# Patient Record
(demographics unavailable — no encounter records)

---

## 2025-05-15 NOTE — HISTORY OF PRESENT ILLNESS
[de-identified] : Ms. LAZARA PARK is a 62-year-old woman, self-referred for newly dx HER2+ breast cancer, here for an initial visit. She noted a palpable right breast mass x 3 weeks, prompting imaging & subsequent US biopsy confirming breast cancer.  PCP: Dr. Gerardo. she had a reduction mammoplasty 3 years ago but has never had any breast masses  She is accompanied by her daughter Donnell.  She started annual mammograms in her 40's and denies any prior breast biopsies.  FamHX of sister w/ breast cancer age unknown but . PMH of B/L mammoplasty   B/L mammo 2024 ([facility]) - BIRADS 2  In 2025 she noted a palpable mass in her right breast prompting diagnostic imaging.  RIGHT mammo/sono 2025 (R) - R 8:00 N8, at palpable abnormality, 2.9 cm lobulated/spiculated mass c/w malignant lesion -> f/u R US biopsy BI-RADS 5  US biopsy 2025 - R 8:00 N8 (stoplight): poorly differentiated IDC, no LVI, ER+/[place]-/TWU4kul + (3+), Ki67: 60%   PMH: none PSH: breast reduction, ankle surgery, shoulder surgery, gastric sleeve () 20 lb weight loss Family hx. in her sister

## 2025-05-15 NOTE — ASSESSMENT
[FreeTextEntry1] : IMP: 63yo F w/ HER2+ Right breast cancer (8:00 N + 8)  US biopsy 4/25/2025 - R 8:00 N8 (stoplight): poorly differentiated IDC, no LVI, ER+ /PA- /QYN3elc + (3+), Ki67: 60%   PLAN: MRI CT C/A/P & bone scan genetics (sister w/ breast cancer) drawn today Evaluation at [facility] next Thursday for probable kleber-adjuvant chemotherapy

## 2025-05-15 NOTE — ASSESSMENT
[FreeTextEntry1] : IMP: 61yo F w/ HER2+ Right breast cancer (8:00 N + 8)  US biopsy 4/25/2025 - R 8:00 N8 (stoplight): poorly differentiated IDC, no LVI, ER+ /ND- /YJP4ich + (3+), Ki67: 60%   PLAN: MRI CT C/A/P & bone scan genetics (sister w/ breast cancer) drawn today Evaluation at [facility] next Thursday for probable kleber-adjuvant chemotherapy

## 2025-05-15 NOTE — PHYSICAL EXAM
[Normal] : supple, no neck mass and thyroid not enlarged [Normal Supraclavicular Lymph Nodes] : normal supraclavicular lymph nodes [Normal Axillary Lymph Nodes] : normal axillary lymph nodes [Normal] : oriented to person, place and time, with appropriate affect [de-identified] : Reduction scars witha 4 cm mass in the lateral aspect of the right breast

## 2025-05-15 NOTE — ASSESSMENT
[FreeTextEntry1] : IMP: 63yo F w/ HER2+ Right breast cancer (8:00 N + 8)  US biopsy 4/25/2025 - R 8:00 N8 (stoplight): poorly differentiated IDC, no LVI, ER+ /TN- /TFR9gkq + (3+), Ki67: 60%   PLAN: MRI CT C/A/P & bone scan genetics (sister w/ breast cancer) drawn today Evaluation at [facility] next Thursday for probable kleber-adjuvant chemotherapy   Home

## 2025-05-15 NOTE — HISTORY OF PRESENT ILLNESS
[de-identified] : Ms. LAZARA PARK is a 62-year-old woman, self-referred for newly dx HER2+ breast cancer, here for an initial visit. She noted a palpable right breast mass x 3 weeks, prompting imaging & subsequent US biopsy confirming breast cancer.  PCP: Dr. Gerardo. she had a reduction mammoplasty 3 years ago but has never had any breast masses  She is accompanied by her daughter Donnell.  She started annual mammograms in her 40's and denies any prior breast biopsies.  FamHX of sister w/ breast cancer age unknown but . PMH of B/L mammoplasty   B/L mammo 2024 ([facility]) - BIRADS 2  In 2025 she noted a palpable mass in her right breast prompting diagnostic imaging.  RIGHT mammo/sono 2025 (R) - R 8:00 N8, at palpable abnormality, 2.9 cm lobulated/spiculated mass c/w malignant lesion -> f/u R US biopsy BI-RADS 5  US biopsy 2025 - R 8:00 N8 (stoplight): poorly differentiated IDC, no LVI, ER+/[place]-/CQG6afo + (3+), Ki67: 60%   PMH: none PSH: breast reduction, ankle surgery, shoulder surgery, gastric sleeve () 20 lb weight loss Family hx. in her sister

## 2025-05-15 NOTE — PHYSICAL EXAM
[Normal] : supple, no neck mass and thyroid not enlarged [Normal Supraclavicular Lymph Nodes] : normal supraclavicular lymph nodes [Normal Axillary Lymph Nodes] : normal axillary lymph nodes [Normal] : oriented to person, place and time, with appropriate affect [de-identified] : Reduction scars witha 4 cm mass in the lateral aspect of the right breast

## 2025-05-15 NOTE — HISTORY OF PRESENT ILLNESS
[de-identified] : Ms. LAZARA PARK is a 62-year-old woman, self-referred for newly dx HER2+ breast cancer, here for an initial visit. She noted a palpable right breast mass x 3 weeks, prompting imaging & subsequent US biopsy confirming breast cancer.  PCP: Dr. Gerardo. she had a reduction mammoplasty 3 years ago but has never had any breast masses  She is accompanied by her daughter Donnell.  She started annual mammograms in her 40's and denies any prior breast biopsies.  FamHX of sister w/ breast cancer age unknown but . PMH of B/L mammoplasty   B/L mammo 2024 ([facility]) - BIRADS 2  In 2025 she noted a palpable mass in her right breast prompting diagnostic imaging.  RIGHT mammo/sono 2025 (R) - R 8:00 N8, at palpable abnormality, 2.9 cm lobulated/spiculated mass c/w malignant lesion -> f/u R US biopsy BI-RADS 5  US biopsy 2025 - R 8:00 N8 (stoplight): poorly differentiated IDC, no LVI, ER+/[place]-/THN4qrz + (3+), Ki67: 60%   PMH: none PSH: breast reduction, ankle surgery, shoulder surgery, gastric sleeve () 20 lb weight loss Family hx. in her sister

## 2025-05-15 NOTE — CONSULT LETTER
[FreeTextEntry2] : Kody Arrieta MD [FreeTextEntry1] : I will keep you informed of the extent of disease work-up and my subsequent plans. [FreeTextEntry3] : Gibran Mars MD FACS Chief of Surgical Oncology

## 2025-05-15 NOTE — PHYSICAL EXAM
[Normal] : supple, no neck mass and thyroid not enlarged [Normal Supraclavicular Lymph Nodes] : normal supraclavicular lymph nodes [Normal Axillary Lymph Nodes] : normal axillary lymph nodes [Normal] : oriented to person, place and time, with appropriate affect [de-identified] : Reduction scars witha 4 cm mass in the lateral aspect of the right breast

## 2025-05-20 NOTE — HISTORY OF PRESENT ILLNESS
[FreeTextEntry1] : Ms Gutiérrez is a 62-year-old female with newly dx HER2+ breast cancer. The patient was referred her by her breast surgeon for radiotherapy consultation.  She started annual mammograms in her 40's and denies any history of prior breast biopsies. The patient reports she had a reduction mammoplasty 3 years ago but has never noted any breast masses upon palpation. The patient noted a palpable right breast mass at about 4 weeks ago, prompting imaging & subsequent US biopsy confirming breast cancer.  On 2024 Bilateral  screening  mammogram, There is no suspicious masses, calcifications, or areas of architectural distortion. Post  reduction mammoplasty changes are present. Benign appearing calcifications are present. No mammographic evidence of  malignancy,  BI RADS 2  The patient noted a palpable right breast mass at about 4 weeks ago  On 2025, pt had Unilateral right diagnostic mammogram/breast US which showed right breast at 6:00, 2cmfn a [id number] cyst. Corresponding to the palpable abnormality at 8.00, 8cmfn is a 2.9x2.2x2.6cm lobulated/microlobulated/spiculated mass with vascularity and sonographic characteristics concordant with a malignancy. The palpable mass in the in the right breast has mammographic and sonographic characteristic concordant with malignant lesion. Sonographic guided core biopsy was recommended, BI RADS 5.  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 [place] score 9/9.Invasive tumor measures at least 19 mm. Lymphovascular permeation by tumor not seen, ER:  Positive (11-20%), NC:  Negative (<1%), Her-2:  Positive (score 3+)., Ki67:  60%.  She has family history of sister with breast cancer age unknown but now .

## 2025-05-22 NOTE — PHYSICAL EXAM
[Fully active, able to carry on all pre-disease performance without restriction] : Status 0 - Fully active, able to carry on all pre-disease performance without restriction [Normal] : affect appropriate [de-identified] : Bilateral reduction mastopexies, keloids R > L. R breast with large, hard mass 7:00 measuring 4cm . No nipple retraction or drainage. ; left breast with no palpable lesions [de-identified] : keloids in mastoplexy scars

## 2025-05-22 NOTE — CONSULT LETTER
[Dear  ___] : Dear  [unfilled], [Consult Letter:] : I had the pleasure of evaluating your patient, [unfilled]. [Please see my note below.] : Please see my note below. [Consult Closing:] : Thank you very much for allowing me to participate in the care of this patient.  If you have any questions, please do not hesitate to contact me. [Sincerely,] : Sincerely, [FreeTextEntry3] : Audrey Clements MD   Division of Hematology/[place] [address] Tel: [phone] Fax: [phone] Email: [email or web address] [DrUnique  ___] : Dr. MORTON [DrUnique ___] : Dr. MORTON

## 2025-05-22 NOTE — ASSESSMENT
[FreeTextEntry1] : 63 yo woman presented with clinical Stage IIA right breast cancer (ER+ weak, NV neg, HER2+)  - awaiting completion of staging scans - due for bone scan on 5/27/25 and CT abd/Plv will be scheduled on same day to complete extent of disease staging scans - reviewed CT Chest will need follow-up CT in about 3 months (8/2025) to ensure stability of pulmonary nodules - discussed option of neoadjuvant chemotherapy with TCHP regimen (docetaxel 75mg/m2, carboplatin AUC 5-6, trastuzumab 6mg/kg after loading dose and pertuzumab 420mg after loading dose) for a total of 6 doses every 3 weeks. She will need growth factor support with this regimen with peg-filgastrim to be administered at 24 hours after completion of treatment. Advised that since she does not have lymph node involvement, I would omit anthracycline based chemotherapy.  She may need adjuvant RT as well if she opts for breast conservation surgery. - at the time of surgery, if she achieves complete pathologic response, would continue HER2 directed therapy with trastuzumab and pertuzumab to complete 1 year of therapy. However, if she has residual disease, would change her therapy to TDM-1 (Kadcyla) to complete 1 year of therapy as per results of the Yenifer Trial that has shown better disease free survival. - risks/benefits/side effects including but not limited to myelousuppression, neuropathy, cardiotoxicity, nephrotoxicity, fatigue, nausea, vomiting, diarrhea and alopecia were discussed with the patient and her family. Discussed option of cold-capping at the time of therapy to reduce alopecia and discussed "cold gloves and socks" to reduce taxane associated neuropathy. - Discussed the option of Cold Capping. - will check labs today including CBC, Chem panel, hepatitis profile and coag - will need Mediport placement; ordered via IR department - will need Echocardiogram; patient reports she had both EKG and Echo done with PMD (Dr. Hayden Gerardo); surgical team to obtain these reports  - Discussed with patient that exercise and lifestyle modifications can result in improvement of fatigue, anxiety, sleep disturbance, depression and ultimately overall survival. Based on the current literature, an effective exercise prescription that most consistently addresses health-related outcomes experienced due to cancer diagnosis and cancer treatment includes moderate intensity aerobic training at least 3 times per week, for at least 30 minutes for at least 8-12 weeks. The addition of resistance training to aerobic training, at least 2 times per week, using at least 2 sets of 8-15 repetitions at least 60% of one repetition maximum, appears to result in similar benefits (Cyndee et all 2019, American College of Sports Medicine).  - Prescription for "cranial prosthesis" given.  - Patient had the opportunity to have all their questions answered to their satisfaction - f/u after scans for formal chemotherapy teaching and planning session

## 2025-05-22 NOTE — PHYSICAL EXAM
[Fully active, able to carry on all pre-disease performance without restriction] : Status 0 - Fully active, able to carry on all pre-disease performance without restriction [Normal] : affect appropriate [de-identified] : Bilateral reduction mastopexies, keloids R > L. R breast with large, hard mass 7:00 measuring 4cm . No nipple retraction or drainage. ; left breast with no palpable lesions [de-identified] : keloids in mastoplexy scars

## 2025-05-22 NOTE — HISTORY OF PRESENT ILLNESS
[Disease: _____________________] : Disease: [unfilled] [T: ___] : T[unfilled] [N: ___] : N[unfilled] [M: ___] : M[unfilled] [AJCC Stage: ____] : AJCC Stage: [unfilled] [ECOG Performance Status: 0 - Fully active, able to carry on all pre-disease performance without restriction] : Performance Status: 0 - Fully active, able to carry on all pre-disease performance without restriction [de-identified] : Ms Gutiérrez seen at age 62 for initial visit as part of [facility] program; diagnosed with ER+ HER2+ right breast cancer. Referred for discussion about neoadjuvant chemotherpy  She started annual mammograms in her 40's and denies any history of prior breast biopsies. She underwent reduction mammoplasty 3 years ago but has never noted any breast masses upon palpation. In 2025 she palpabled right breast mass,  prompting imaging & subsequent US biopsy confirming breast cancer.  2024 Bilateral screening mammogram - no suspicious masses, calcifications, or areas of architectural distortion. Post reduction mammoplasty changes are present. Benign appearing calcifications are present. No mammographic evidence of malignancy, BI RADS 2  2025 Unilateral right breast diagnostic mammogram/breast US  ([facility])  6:00, 2cmfn a [id number] cyst. Corresponding to the palpable abnormality at 8.00, 8cmfn is a 2.9x2.2x2.6cm lobulated/microlobulated/spiculated mass with vascularity and sonographic characteristics concordant with a malignancy. The palpable mass in the in the right breast has mammographic and sonographic characteristic concordant with malignant lesion. Sonographic guided core biopsy was recommended, BI RADS 5.  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 [place] score 9/9.Invasive tumor measures at least 19 mm. Lymphovascular permeation by tumor not seen, ER: Positive (11-20%), FL: Negative (<1%), Her-2: Positive (score 3+)., Ki67: 60%.  25 MRI Breast - IMPRESSION: - Biopsy proven malignancy in the lower outer right breast for which continued surgical/oncologic management is recommended. - Multiple additional indeterminate areas of nonmass enhancement superior to the dominant mass, as well as additional subcentimeter enhancing  masses outer breast - if it will alter management, MR guided biopsy of one of these areas distant to the dominant mass may be performed. - 5 mm indeterminate enhancing mass in the superior central left breast for which MR guided core needle biopsy is recommended. Assuming benign results are obtained short-term follow-up of additional finding in the far posterior medial to central left breast would be recommended. - Nonspecific skin thickening and enhancement along the lower outer right breast and chest wall which is nonspecific (c/w keloids)  25 CT Chest- small subcm pulm nodules (less than 4mm); no other evidence of malignancy CT Abd/Plv pending 25  Bone scan  RISK ASSESSMENT: ; first full term pregnancy at age 18 Menarche at 14, menopause at 49 No OCPs, No HRT, No fertility treatments  Sister with breast cancer now ; was diagnosed in her 40s; breast cancer in both maternal and paternal cousings [employer] - pending [de-identified] : poorly differentiated [de-identified] : ER+ weak (11-20%) PA neg, HER2 + (1HC 3+) [de-identified] : Ki67 60% [de-identified] : 5/22/25: Initial consult with medical oncology.  PCP - Dr. Hayden Gerardo Surgeon - Dr. Gibran Mars Rad Onc - Dr. Sondra Gerardo [100: Normal, no complaints, no evidence of disease.] : 100: Normal, no complaints, no evidence of disease.

## 2025-05-22 NOTE — REASON FOR VISIT
[Initial Consultation] : an initial consultation [Friend] : friend [FreeTextEntry2] : Right breast cancer

## 2025-05-22 NOTE — HISTORY OF PRESENT ILLNESS
[Disease: _____________________] : Disease: [unfilled] [T: ___] : T[unfilled] [N: ___] : N[unfilled] [M: ___] : M[unfilled] [AJCC Stage: ____] : AJCC Stage: [unfilled] [ECOG Performance Status: 0 - Fully active, able to carry on all pre-disease performance without restriction] : Performance Status: 0 - Fully active, able to carry on all pre-disease performance without restriction [de-identified] : Ms Gutiérrez seen at age 62 for initial visit as part of [facility] program; diagnosed with ER+ HER2+ right breast cancer. Referred for discussion about neoadjuvant chemotherpy  She started annual mammograms in her 40's and denies any history of prior breast biopsies. She underwent reduction mammoplasty 3 years ago but has never noted any breast masses upon palpation. In 2025 she palpabled right breast mass,  prompting imaging & subsequent US biopsy confirming breast cancer.  2024 Bilateral screening mammogram - no suspicious masses, calcifications, or areas of architectural distortion. Post reduction mammoplasty changes are present. Benign appearing calcifications are present. No mammographic evidence of malignancy, BI RADS 2  2025 Unilateral right breast diagnostic mammogram/breast US  ([facility])  6:00, 2cmfn a [id number] cyst. Corresponding to the palpable abnormality at 8.00, 8cmfn is a 2.9x2.2x2.6cm lobulated/microlobulated/spiculated mass with vascularity and sonographic characteristics concordant with a malignancy. The palpable mass in the in the right breast has mammographic and sonographic characteristic concordant with malignant lesion. Sonographic guided core biopsy was recommended, BI RADS 5.  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 [place] score 9/9.Invasive tumor measures at least 19 mm. Lymphovascular permeation by tumor not seen, ER: Positive (11-20%), CA: Negative (<1%), Her-2: Positive (score 3+)., Ki67: 60%.  25 MRI Breast - IMPRESSION: - Biopsy proven malignancy in the lower outer right breast for which continued surgical/oncologic management is recommended. - Multiple additional indeterminate areas of nonmass enhancement superior to the dominant mass, as well as additional subcentimeter enhancing  masses outer breast - if it will alter management, MR guided biopsy of one of these areas distant to the dominant mass may be performed. - 5 mm indeterminate enhancing mass in the superior central left breast for which MR guided core needle biopsy is recommended. Assuming benign results are obtained short-term follow-up of additional finding in the far posterior medial to central left breast would be recommended. - Nonspecific skin thickening and enhancement along the lower outer right breast and chest wall which is nonspecific (c/w keloids)  25 CT Chest- small subcm pulm nodules (less than 4mm); no other evidence of malignancy CT Abd/Plv pending 25  Bone scan  RISK ASSESSMENT: ; first full term pregnancy at age 18 Menarche at 14, menopause at 49 No OCPs, No HRT, No fertility treatments  Sister with breast cancer now ; was diagnosed in her 40s; breast cancer in both maternal and paternal cousings [employer] - pending [de-identified] : poorly differentiated [de-identified] : ER+ weak (11-20%) VA neg, HER2 + (1HC 3+) [de-identified] : Ki67 60% [de-identified] : 5/22/25: Initial consult with medical oncology.  PCP - Dr. Hayden Gerardo Surgeon - Dr. Gibran Mars Rad Onc - Dr. Sondra Gerardo [100: Normal, no complaints, no evidence of disease.] : 100: Normal, no complaints, no evidence of disease.

## 2025-05-22 NOTE — ASSESSMENT
[FreeTextEntry1] : 61 yo woman presented with clinical Stage IIA right breast cancer (ER+ weak, NV neg, HER2+)  - awaiting completion of staging scans - due for bone scan on 5/27/25 and CT abd/Plv will be scheduled on same day to complete extent of disease staging scans - reviewed CT Chest will need follow-up CT in about 3 months (8/2025) to ensure stability of pulmonary nodules - discussed option of neoadjuvant chemotherapy with TCHP regimen (docetaxel 75mg/m2, carboplatin AUC 5-6, trastuzumab 6mg/kg after loading dose and pertuzumab 420mg after loading dose) for a total of 6 doses every 3 weeks. She will need growth factor support with this regimen with peg-filgastrim to be administered at 24 hours after completion of treatment. Advised that since she does not have lymph node involvement, I would omit anthracycline based chemotherapy.  She may need adjuvant RT as well if she opts for breast conservation surgery. - at the time of surgery, if she achieves complete pathologic response, would continue HER2 directed therapy with trastuzumab and pertuzumab to complete 1 year of therapy. However, if she has residual disease, would change her therapy to TDM-1 (Kadcyla) to complete 1 year of therapy as per results of the Yenifer Trial that has shown better disease free survival. - risks/benefits/side effects including but not limited to myelousuppression, neuropathy, cardiotoxicity, nephrotoxicity, fatigue, nausea, vomiting, diarrhea and alopecia were discussed with the patient and her family. Discussed option of cold-capping at the time of therapy to reduce alopecia and discussed "cold gloves and socks" to reduce taxane associated neuropathy. - Discussed the option of Cold Capping. - will check labs today including CBC, Chem panel, hepatitis profile and coag - will need Mediport placement; ordered via IR department - will need Echocardiogram; patient reports she had both EKG and Echo done with PMD (Dr. Hayden Gerardo); surgical team to obtain these reports  - Discussed with patient that exercise and lifestyle modifications can result in improvement of fatigue, anxiety, sleep disturbance, depression and ultimately overall survival. Based on the current literature, an effective exercise prescription that most consistently addresses health-related outcomes experienced due to cancer diagnosis and cancer treatment includes moderate intensity aerobic training at least 3 times per week, for at least 30 minutes for at least 8-12 weeks. The addition of resistance training to aerobic training, at least 2 times per week, using at least 2 sets of 8-15 repetitions at least 60% of one repetition maximum, appears to result in similar benefits (Cyndee et all 2019, American College of Sports Medicine).  - Prescription for "cranial prosthesis" given.  - Patient had the opportunity to have all their questions answered to their satisfaction - f/u after scans for formal chemotherapy teaching and planning session

## 2025-05-22 NOTE — CONSULT LETTER
[Dear  ___] : Dear  [unfilled], [Consult Letter:] : I had the pleasure of evaluating your patient, [unfilled]. [Please see my note below.] : Please see my note below. [Consult Closing:] : Thank you very much for allowing me to participate in the care of this patient.  If you have any questions, please do not hesitate to contact me. [Sincerely,] : Sincerely, [FreeTextEntry3] : Audrey Clements MD   Division of Hematology/[place] [address] Tel: [phone] Fax: [phone] Email: [email or web address] [DrUnique  ___] : Dr. MORTON [DrUinque ___] : Dr. MORTON

## 2025-05-28 NOTE — HISTORY OF PRESENT ILLNESS
[FreeTextEntry1] : Ms. Gutiérrez is a 65-year-old female with newly diagnosed breast cancer, referred to the [facility] for consultation regarding breast cancer treatment.  She was undergoing routine annual screening mammography. Screening mammography ([facility]) on 6/17/24 showed no evidence of malignancy. She then palpated a mass in the right breast in March 2025. Right diagnostic mammography on 4/16/25 showed architectural distortion in the upper outer quadrant measuring up to 2.6 cm with associated spiculation consistent with malignancy. Breast ultrasound showed a lobulated spiculated hypoechoic mass measuring up to 2.9 cm in the right breast at 8:00 8 cm FN, corresponding to the mammographic finding.  Ultrasound guided core biopsy of the right breast on 4/25/25 showed invasive poorly differentiated ductal carcinoma, [place] score 9/9, measuring at least 18 mm. There was no in situ carcinoma and no lymphovascular invasion. IHC showed ER 11-20%, [place] <1%%, and HER2 3+, and Ki-67 60%.   Breast MRI on 5/21/25 showed recently diagnosed malignancy in the right lower outer quadrant at posterior depth measuring up to 3.6 cm. Skin thickening was present along the lateral lower outer right breast. Nonmass enhancement spanning up to 5.6 cm was noted in the central outer right breast. Additional smaller enhancing masses were noted. A 5 mm indeterminate enhancing mass was seen in the superior central left breast. There was no axillary or internal mammary lymphadenopathy.  Extent of disease work up including CT AP on 5/21/25 showed no evidence of distant metastatic disease.   CT chest and bone scan are scheduled for 5/27/25.   Results of genetic testing on 5/15/25 are pending.

## 2025-05-28 NOTE — PHYSICAL EXAM
[Sclera] : the sclera and conjunctiva were normal [Outer Ear] : the ears and nose were normal in appearance [] : no respiratory distress [Heart Rate And Rhythm] : heart rate and rhythm were normal [No UE Edema] : there is no upper extremity edema [Abdomen Soft] : soft [Nondistended] : nondistended [Supraclavicular Lymph Nodes Enlarged Bilaterally] : supraclavicular [Axillary Lymph Nodes Enlarged Bilaterally] : axillary [de-identified] : right breast mass at lateral aspect, no skin involvement or tethering, no erythema, mobile, approx 4 cm; bilateral mammoplasty scars with prominent keloid formation

## 2025-05-28 NOTE — LETTER CLOSING
[Consult Closing:] : Thank you for allowing me to participate in the care of this patient.  If you have any questions, please do not hesitate to contact me. [Sincerely yours,] : Sincerely yours, [FreeTextEntry3] : Sondra Gerardo MD

## 2025-05-28 NOTE — PHYSICAL EXAM
[Sclera] : the sclera and conjunctiva were normal [Outer Ear] : the ears and nose were normal in appearance [] : no respiratory distress [Heart Rate And Rhythm] : heart rate and rhythm were normal [No UE Edema] : there is no upper extremity edema [Abdomen Soft] : soft [Nondistended] : nondistended [Supraclavicular Lymph Nodes Enlarged Bilaterally] : supraclavicular [Axillary Lymph Nodes Enlarged Bilaterally] : axillary [de-identified] : right breast mass at lateral aspect, no skin involvement or tethering, no erythema, mobile, approx 4 cm; bilateral mammoplasty scars with prominent keloid formation

## 2025-05-28 NOTE — HISTORY OF PRESENT ILLNESS
[FreeTextEntry1] : Ms. Gutiérrez is a 65-year-old female with newly diagnosed breast cancer, referred to the [facility] for consultation regarding breast cancer treatment.  She was undergoing routine annual screening mammography. Screening mammography ([facility]) on 6/17/24 showed no evidence of malignancy. She then palpated a mass in the right breast in March 2025. Right diagnostic mammography on 4/16/25 showed architectural distortion in the upper outer quadrant measuring up to 2.6 cm with associated spiculation consistent with malignancy. Breast ultrasound showed a lobulated spiculated hypoechoic mass measuring up to 2.9 cm in the right breast at 8:00 8 cm FN, corresponding to the mammographic finding.  Ultrasound guided core biopsy of the right breast on 4/25/25 showed invasive poorly differentiated ductal carcinoma, [place] score 9/9, measuring at least 18 mm. There was no in situ carcinoma and no lymphovascular invasion. IHC showed ER 11-20%, [place] <1%%, and HER2 3+, and Ki-67 60%.   Breast MRI on 5/21/25 showed recently diagnosed malignancy in the right lower outer quadrant at posterior depth measuring up to 3.6 cm. Skin thickening was present along the lateral lower outer right breast. Nonmass enhancement spanning up to 5.6 cm was noted in the central outer right breast. Additional smaller enhancing masses were noted. A 5 mm indeterminate enhancing mass was seen in the superior central left breast. There was no axillary or internal mammary lymphadenopathy.  Extent of disease work up including CT AP on 5/21/25 showed no evidence of distant metastatic disease.   CT chest and bone scan are scheduled for 5/27/25.   Results of genetic testing on 5/15/25 are pending.

## 2025-06-02 NOTE — ASSESSMENT
[FreeTextEntry1] : Pt seen and assessed for placement of port for venous access.  Chart reviewed, imaging and labs evaluated and acceptable for intervention. Consent obtained with risks, benefits explained.  Will place port with sedation.Anesthesia plan formulated and discussed with anesthesiologist.  8 [place] powerport placed using US and fluoro guidance. Tip in SVC.  EBL=minimal.  May use immediately.  Full report to follow.

## 2025-06-02 NOTE — PROCEDURE
[D/C IV on discharge] : D/C IV on discharge [Resume diet] : resume diet [Site check for bleeding/hematoma] : Site check for bleeding/hematoma [Vital signs on admission the q 15 mins x2] : Vital signs on admission the q 15 mins x2 [FreeTextEntry1] : mediport placement

## 2025-06-02 NOTE — PAST MEDICAL HISTORY
[Increasing age ( >40 years old)] : Increasing age ( >40 years old) [Malignancy] : Malignancy [No therapy indicated for cases scheduled for less than one hour] : No therapy indicated for cases scheduled for less than one hour. [FreeTextEntry1] : Malignant Hyperthermia Screening Tool and Risk of Bleeding Assessment  Ms. LAZARA PARK denies family history of unexpected death following Anesthesia or Exercise. Denies Family history of Malignant Hyperthermia, Muscle or Neuromuscular disorder and High Temperature following exercise.  Ms. LAZARA PARK denies history of Muscle Spasm, Dark or Chocolate - Colored urine and Unanticipated fever immediately following anesthesia or serious exercise.  Ms. PARK also denies bleeding tendencies/ Risks of Bleeding.

## 2025-06-02 NOTE — HISTORY OF PRESENT ILLNESS
[FreeTextEntry1] : alert and oriented accompanied by daughter Donnell [phone] no medications taken this morning  [FreeTextEntry5] : yesterday 5pm [FreeTextEntry6] : Dr Bond

## 2025-06-02 NOTE — HISTORY OF PRESENT ILLNESS
[FreeTextEntry1] : alert and oriented accompanied by daughter Donnell [phone] no medications taken this morning  [FreeTextEntry5] : yesterday 5pm [FreeTextEntry6] : Dr Bond

## 2025-06-02 NOTE — ASSESSMENT
[FreeTextEntry1] : Pt seen and assessed for placement of port for venous access.  Chart reviewed, imaging and labs evaluated and acceptable for intervention. Consent obtained with risks, benefits explained.  Will place port with sedation.Anesthesia plan formulated and discussed with anesthesiologist.  8 [place] powerport placed using US and fluoro guidance. Tip in SVC.  EBL=minimal.  May use immediately.  Full report to follow.

## 2025-06-25 NOTE — REASON FOR VISIT
[Follow-Up Visit] : a follow-up [Family Member] : family member [FreeTextEntry2] : Right breast cancer

## 2025-06-25 NOTE — HISTORY OF PRESENT ILLNESS
[Disease: _____________________] : Disease: [unfilled] [T: ___] : T[unfilled] [N: ___] : N[unfilled] [M: ___] : M[unfilled] [AJCC Stage: ____] : AJCC Stage: [unfilled] [100: Normal, no complaints, no evidence of disease.] : 100: Normal, no complaints, no evidence of disease. [ECOG Performance Status: 0 - Fully active, able to carry on all pre-disease performance without restriction] : Performance Status: 0 - Fully active, able to carry on all pre-disease performance without restriction [de-identified] : Ms Gutiérrez seen at age 62 for initial visit as part of [facility] program; diagnosed with ER+ HER2+ right breast cancer. Referred for discussion about neoadjuvant chemotherpy  She started annual mammograms in her 40's and denies any history of prior breast biopsies. She underwent reduction mammoplasty 3 years ago but has never noted any breast masses upon palpation. In 2025 she palpabled right breast mass,  prompting imaging & subsequent US biopsy confirming breast cancer.  2024 Bilateral screening mammogram - no suspicious masses, calcifications, or areas of architectural distortion. Post reduction mammoplasty changes are present. Benign appearing calcifications are present. No mammographic evidence of malignancy, BI RADS 2  2025 Unilateral right breast diagnostic mammogram/breast US  ([facility])  6:00, 2cmfn a [id number] cyst. Corresponding to the palpable abnormality at 8.00, 8cmfn is a 2.9x2.2x2.6cm lobulated/microlobulated/spiculated mass with vascularity and sonographic characteristics concordant with a malignancy. The palpable mass in the in the right breast has mammographic and sonographic characteristic concordant with malignant lesion. Sonographic guided core biopsy was recommended, BI RADS 5.  2025 Breast, right, 8 o'clock 8 cmFN,(spiculated palpable mass) core biopsy showed (path at [facility]); reviewed at [facility] 25  Invasive poorly differentiated duct carcinoma with tumor giant cells (anaplastic features) and necrosis. [place] score 9/9.Invasive tumor measures at least 19 mm. Lymphovascular permeation by tumor not seen, ER: Positive (11-20%), MI: Negative (<1%), Her-2: Positive (score 3+)., Ki67: 60%.  25 MRI Breast - IMPRESSION: - Biopsy proven malignancy in the lower outer right breast for which continued surgical/oncologic management is recommended. - Multiple additional indeterminate areas of nonmass enhancement superior to the dominant mass, as well as additional subcentimeter enhancing  masses outer breast - if it will alter management, MR guided biopsy of one of these areas distant to the dominant mass may be performed. - 5 mm indeterminate enhancing mass in the superior central left breast for which MR guided core needle biopsy is recommended. Assuming benign results are obtained short-term follow-up of additional finding in the far posterior medial to central left breast would be recommended. - Nonspecific skin thickening and enhancement along the lower outer right breast and chest wall which is nonspecific (c/w keloids)  25 CT Chest- small subcm pulm nodules (less than 4mm); no other evidence of malignancy CT Abd/Plv pending 25  Bone scan  RISK ASSESSMENT: ; first full term pregnancy at age 18 Menarche at 14, menopause at 49 No OCPs, No HRT, No fertility treatments  Sister with breast cancer now ; was diagnosed in her 40s; breast cancer in both maternal and paternal cousings Germline Genetics - Negative, VUS MSH6 gene  PCP - Dr. Hayden Gerardo Surgeon - Dr. Gibran Mars Rad Onc - Dr. Sondra Gerardo [de-identified] : poorly differentiated [de-identified] : ER+ weak (11-20%) TN neg, HER2 + (1HC 3+) [de-identified] : Ki67 60% [de-identified] : 6/24/25 Patient returns today to rule out progression or recurrence of breast cancer and to assess treatment toxicity.  Today is C1D8 of [facility]. The patient state she had episode of diarrhea on day 3 that resolved with imodium.  She c/o acid reflux.  She did not  the omeprazole the was prescribed at previous visit. She denies any other complaints, overall, she states she is feeling well.  She denies any n/v, numbness/tingling, cough, cp, sob.   Recent Imaging: ==============  CT c/a/p, 5/27/25- IMPRESSION:  Two very small indeterminate right lung nodules. Recommend follow-up in 3 months, or per guidelines of primary neoplasm. Nonspecific focal skin thickening in the left lateral chest wall. Recommend direct inspection. Right breast mass consistent with known breast cancer. No evidence of abdominal or pelvic metastatic disease.  Question constipation. Chronic findings as above.  Bone scan, 5/27/25- IMPRESSION: No definite scintigraphic evidence of osseous metastasis.

## 2025-06-25 NOTE — PHYSICAL EXAM
[Fully active, able to carry on all pre-disease performance without restriction] : Status 0 - Fully active, able to carry on all pre-disease performance without restriction [Normal] : affect appropriate [de-identified] : Bilateral reduction mastopexies, keloids R > L. R breast with large, hard mass 7:00 measuring ~7x7 cm . No nipple retraction or drainage. Left breast with no discrete palpable masses, no palpable axillary nodes.  [de-identified] : keloids in mastoplexy scars.

## 2025-06-25 NOTE — END OF VISIT
[FreeTextEntry3] : Patient being seen as per physician's primary plan of care.  [Time Spent: ___ minutes] : I have spent [unfilled] minutes of time on the encounter which excludes teaching and separately reported services.

## 2025-06-25 NOTE — ASSESSMENT
[FreeTextEntry1] : 63 yo woman presented with clinical Stage IIA right breast cancer (ER+ weak, [place] neg, HER2+).  Staging scans showed no evidence of metastatic disease.  CT Chest will need follow-up CT in about 3 months (8/2025) to ensure stability of pulmonary nodules. Recommended treatment regimen with TCHP regimen (docetaxel 75mg/m2, carboplatin AUC 5-6, trastuzumab 6mg/kg after loading dose and pertuzumab 420mg after loading dose) for a total of 6 doses every 3 weeks, with growth factor support with peg-filgastrim was reviewed and discussed with the patient in detail.    -Today is C1D8.  CBC adequate. -The patient was advised to take omeprazole 20 mg daily and new Rx was sent to [facility] for patient to start today. - At the time of surgery, if she achieves complete pathologic response, would continue HER2 directed therapy with trastuzumab and pertuzumab to complete 1 year of therapy. However, if she has residual disease, would change her therapy to TDM-1 (Kadcyla) to complete 1 year of therapy as per results of the Yenifer Trial that has shown better disease-free survival. - She may need adjuvant RT as well if she opts for breast conservation surgery. - Echo done with PMD (Dr. Hayden Gerardo); 4/15/25- LVEF 74%. Repeat due every three months. Next due 9/2025. - Patient had the opportunity to have all her questions answered to her satisfaction - Patient to RTO in two weeks for C2 TCHP [Curative] : Goals of care discussed with patient: Curative

## 2025-07-15 NOTE — ADDENDUM
[FreeTextEntry1] :  I, Koby Estrada, affirm that I have recorded for Dr. Clements on 07/15/2025 to the best of my ability. All medical record entries scribed were at my, Dr. Clements's, direction on 07/15/2025 . I have reviewed the chart and agree that the record accurately reflects my personal performance of the history, physical exam, assessment and plan. I have also personally directed, reviewed, and agree with the discharge instructions.

## 2025-07-15 NOTE — HISTORY OF PRESENT ILLNESS
[Disease: _____________________] : Disease: [unfilled] [T: ___] : T[unfilled] [N: ___] : N[unfilled] [M: ___] : M[unfilled] [AJCC Stage: ____] : AJCC Stage: [unfilled] [100: Normal, no complaints, no evidence of disease.] : 100: Normal, no complaints, no evidence of disease. [ECOG Performance Status: 0 - Fully active, able to carry on all pre-disease performance without restriction] : Performance Status: 0 - Fully active, able to carry on all pre-disease performance without restriction [de-identified] : Ms Gutiérrez seen at age 62 for initial visit as part of [facility] program; diagnosed with ER+ HER2+ right breast cancer. Referred for discussion about neoadjuvant chemotherpy  She started annual mammograms in her 40's and denies any history of prior breast biopsies. She underwent reduction mammoplasty 3 years ago but has never noted any breast masses upon palpation. In 2025 she palpabled right breast mass,  prompting imaging & subsequent US biopsy confirming breast cancer.  2024 Bilateral screening mammogram - no suspicious masses, calcifications, or areas of architectural distortion. Post reduction mammoplasty changes are present. Benign appearing calcifications are present. No mammographic evidence of malignancy, BI RADS 2  2025 Unilateral right breast diagnostic mammogram/breast US  ([facility])  6:00, 2cmfn a [id number] cyst. Corresponding to the palpable abnormality at 8.00, 8cmfn is a 2.9x2.2x2.6cm lobulated/microlobulated/spiculated mass with vascularity and sonographic characteristics concordant with a malignancy. The palpable mass in the in the right breast has mammographic and sonographic characteristic concordant with malignant lesion. Sonographic guided core biopsy was recommended, BI RADS 5.  2025 Breast, right, 8 o'clock 8 cmFN,(spiculated palpable mass) core biopsy showed (path [facility]); reviewed at [facility] 25  Invasive poorly differentiated duct carcinoma with tumor giant cells (anaplastic features) and necrosis. [place] score 9/9.Invasive tumor measures at least 19 mm. Lymphovascular permeation by tumor not seen, ER: Positive (11-20%), [place]: Negative (<1%), Her-2: Positive (score 3+)., Ki67: 60%.  25 MRI Breast - IMPRESSION: - Biopsy proven malignancy in the lower outer right breast for which continued surgical/oncologic management is recommended. - Multiple additional indeterminate areas of nonmass enhancement superior to the dominant mass, as well as additional subcentimeter enhancing  masses outer breast - if it will alter management, MR guided biopsy of one of these areas distant to the dominant mass may be performed. - 5 mm indeterminate enhancing mass in the superior central left breast for which MR guided core needle biopsy is recommended. Assuming benign results are obtained short-term follow-up of additional finding in the far posterior medial to central left breast would be recommended. - Nonspecific skin thickening and enhancement along the lower outer right breast and chest wall which is nonspecific (c/w keloids)  25 CT Chest- small subcm pulm nodules (less than 4mm); no other evidence of malignancy CT Abd/Plv pending 25  Bone scan  RISK ASSESSMENT: ; first full term pregnancy at age 18 Menarche at 14, menopause at 49 No OCPs, No HRT, No fertility treatments  Sister with breast cancer now ; was diagnosed in her 40s; breast cancer in both maternal and paternal cousings Germline Genetics - Negative, VUS MSH6 gene  PCP - Dr. Hayden Gerardo Surgeon - Dr. Gibran Mars Rad Onc - Dr. Sondra Gerardo [de-identified] : poorly differentiated [de-identified] : ER+ weak (11-20%) [place] neg, HER2 + (1HC 3+) [de-identified] : Ki67 60% [de-identified] : 7/15/25 Patient returns today to rule out progression or recurrence of breast cancer and to assess treatment toxicity.  Today is C2D8 of TCHP. The patient state she had episode of diarrhea on day 3 that resolved with imodium.   - Reports pain during urination, with cloudy urine similar to previous UTI - Reports legs have been susceptible to muscle cramps - She denies any n/v, numbness/tingling, cough, cp, sob.  Recent Imaging: ============== CT c/a/p, 5/27/25- IMPRESSION:  Two very small indeterminate right lung nodules. Recommend follow-up in 3 months, or per guidelines of primary neoplasm. Nonspecific focal skin thickening in the left lateral chest wall. Recommend direct inspection. Right breast mass consistent with known breast cancer. No evidence of abdominal or pelvic metastatic disease.  Question constipation. Chronic findings as above.  Bone scan, 5/27/25- IMPRESSION: No definite scintigraphic evidence of osseous metastasis.

## 2025-07-15 NOTE — PHYSICAL EXAM
[Fully active, able to carry on all pre-disease performance without restriction] : Status 0 - Fully active, able to carry on all pre-disease performance without restriction [Normal] : affect appropriate [de-identified] : Bilateral reduction mastopexies, keloids R > L. R breast with large, hard mass 7:00 measuring 3cm X4 cm (~7x7 cm at diagnosis);  . No nipple retraction or drainage. Left breast with no discrete palpable masses, no palpable axillary nodes.  [de-identified] : keloids in mastoplexy scars.

## 2025-07-15 NOTE — ASSESSMENT
[Curative] : Goals of care discussed with patient: Curative [FreeTextEntry1] : 63 yo woman presented with clinical Stage IIA right breast cancer (ER+ weak, [place] neg, HER2+).  Staging scans showed no evidence of metastatic disease.  CT Chest will need follow-up CT in about 3 months (8/2025) to ensure stability of pulmonary nodules. Recommended treatment regimen with TCHP regimen (docetaxel 75mg/m2, carboplatin AUC 5-6, trastuzumab 6mg/kg after loading dose and pertuzumab 420mg after loading dose) for a total of 6 doses every 3 weeks, with growth factor support with peg-filgastrim was reviewed and discussed with the patient in detail.    - At the time of surgery, if she achieves complete pathologic response, would continue HER2 directed therapy with trastuzumab and pertuzumab to complete 1 year of therapy. However, if she has residual disease, would change her therapy to TDM-1 (Kadcyla) to complete 1 year of therapy as per results of the Yenifer Trial that has shown better disease-free survival. - She may need adjuvant RT as well if she opts for breast conservation surgery. - Echo done with PMD (Dr. Hayden Gerardo); 4/15/25- LVEF 74%. Repeat due every three months. Next due 9/2025.  - Today is C2D8.  CBC adequate/stable - will order urinalysis and urine culture today - will order bactrim BID for the next 7 days; will change therapy based on results of urine culture  - Patient had the opportunity to have all her questions answered to her satisfaction - Patient to RTO in two weeks for C3 TCHP

## 2025-07-15 NOTE — PHYSICAL EXAM
[Fully active, able to carry on all pre-disease performance without restriction] : Status 0 - Fully active, able to carry on all pre-disease performance without restriction [Normal] : affect appropriate [de-identified] : Bilateral reduction mastopexies, keloids R > L. R breast with large, hard mass 7:00 measuring 3cm X4 cm (~7x7 cm at diagnosis);  . No nipple retraction or drainage. Left breast with no discrete palpable masses, no palpable axillary nodes.  [de-identified] : keloids in mastoplexy scars.

## 2025-07-15 NOTE — END OF VISIT
[Time Spent: ___ minutes] : I have spent [unfilled] minutes of time on the encounter which excludes teaching and separately reported services. [FreeTextEntry3] : Patient being seen as per physician's primary plan of care.

## 2025-07-15 NOTE — PHYSICAL EXAM
[Fully active, able to carry on all pre-disease performance without restriction] : Status 0 - Fully active, able to carry on all pre-disease performance without restriction [Normal] : affect appropriate [de-identified] : Bilateral reduction mastopexies, keloids R > L. R breast with large, hard mass 7:00 measuring 3cm X4 cm (~7x7 cm at diagnosis);  . No nipple retraction or drainage. Left breast with no discrete palpable masses, no palpable axillary nodes.  [de-identified] : keloids in mastoplexy scars.

## 2025-07-15 NOTE — HISTORY OF PRESENT ILLNESS
[Disease: _____________________] : Disease: [unfilled] [T: ___] : T[unfilled] [N: ___] : N[unfilled] [M: ___] : M[unfilled] [AJCC Stage: ____] : AJCC Stage: [unfilled] [100: Normal, no complaints, no evidence of disease.] : 100: Normal, no complaints, no evidence of disease. [ECOG Performance Status: 0 - Fully active, able to carry on all pre-disease performance without restriction] : Performance Status: 0 - Fully active, able to carry on all pre-disease performance without restriction [de-identified] : Ms Gutiérrez seen at age 62 for initial visit as part of [facility] program; diagnosed with ER+ HER2+ right breast cancer. Referred for discussion about neoadjuvant chemotherpy  She started annual mammograms in her 40's and denies any history of prior breast biopsies. She underwent reduction mammoplasty 3 years ago but has never noted any breast masses upon palpation. In 2025 she palpabled right breast mass,  prompting imaging & subsequent US biopsy confirming breast cancer.  2024 Bilateral screening mammogram - no suspicious masses, calcifications, or areas of architectural distortion. Post reduction mammoplasty changes are present. Benign appearing calcifications are present. No mammographic evidence of malignancy, BI RADS 2  2025 Unilateral right breast diagnostic mammogram/breast US  ([facility])  6:00, 2cmfn a [id number] cyst. Corresponding to the palpable abnormality at 8.00, 8cmfn is a 2.9x2.2x2.6cm lobulated/microlobulated/spiculated mass with vascularity and sonographic characteristics concordant with a malignancy. The palpable mass in the in the right breast has mammographic and sonographic characteristic concordant with malignant lesion. Sonographic guided core biopsy was recommended, BI RADS 5.  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 [place] score 9/9.Invasive tumor measures at least 19 mm. Lymphovascular permeation by tumor not seen, ER: Positive (11-20%), IL: Negative (<1%), Her-2: Positive (score 3+)., Ki67: 60%.  25 MRI Breast - IMPRESSION: - Biopsy proven malignancy in the lower outer right breast for which continued surgical/oncologic management is recommended. - Multiple additional indeterminate areas of nonmass enhancement superior to the dominant mass, as well as additional subcentimeter enhancing  masses outer breast - if it will alter management, MR guided biopsy of one of these areas distant to the dominant mass may be performed. - 5 mm indeterminate enhancing mass in the superior central left breast for which MR guided core needle biopsy is recommended. Assuming benign results are obtained short-term follow-up of additional finding in the far posterior medial to central left breast would be recommended. - Nonspecific skin thickening and enhancement along the lower outer right breast and chest wall which is nonspecific (c/w keloids)  25 CT Chest- small subcm pulm nodules (less than 4mm); no other evidence of malignancy CT Abd/Plv pending 25  Bone scan  RISK ASSESSMENT: ; first full term pregnancy at age 18 Menarche at 14, menopause at 49 No OCPs, No HRT, No fertility treatments  Sister with breast cancer now ; was diagnosed in her 40s; breast cancer in both maternal and paternal cousings Germline Genetics - Negative, VUS MSH6 gene  PCP - Dr. Hayden Gerardo Surgeon - Dr. Gibran Mars Rad Onc - Dr. Sondra Gerardo [de-identified] : poorly differentiated [de-identified] : ER+ weak (11-20%) CA neg, HER2 + (1HC 3+) [de-identified] : Ki67 60% [de-identified] : 7/15/25 Patient returns today to rule out progression or recurrence of breast cancer and to assess treatment toxicity.  Today is C2D8 of TCHP. The patient state she had episode of diarrhea on day 3 that resolved with imodium.   - Reports pain during urination, with cloudy urine similar to previous UTI - Reports legs have been susceptible to muscle cramps - She denies any n/v, numbness/tingling, cough, cp, sob.  Recent Imaging: ============== CT c/a/p, 5/27/25- IMPRESSION:  Two very small indeterminate right lung nodules. Recommend follow-up in 3 months, or per guidelines of primary neoplasm. Nonspecific focal skin thickening in the left lateral chest wall. Recommend direct inspection. Right breast mass consistent with known breast cancer. No evidence of abdominal or pelvic metastatic disease.  Question constipation. Chronic findings as above.  Bone scan, 5/27/25- IMPRESSION: No definite scintigraphic evidence of osseous metastasis.

## 2025-07-15 NOTE — HISTORY OF PRESENT ILLNESS
[Disease: _____________________] : Disease: [unfilled] [T: ___] : T[unfilled] [N: ___] : N[unfilled] [M: ___] : M[unfilled] [AJCC Stage: ____] : AJCC Stage: [unfilled] [100: Normal, no complaints, no evidence of disease.] : 100: Normal, no complaints, no evidence of disease. [ECOG Performance Status: 0 - Fully active, able to carry on all pre-disease performance without restriction] : Performance Status: 0 - Fully active, able to carry on all pre-disease performance without restriction [de-identified] : Ms Gutiérrez seen at age 62 for initial visit as part of [facility] program; diagnosed with ER+ HER2+ right breast cancer. Referred for discussion about neoadjuvant chemotherpy  She started annual mammograms in her 40's and denies any history of prior breast biopsies. She underwent reduction mammoplasty 3 years ago but has never noted any breast masses upon palpation. In 2025 she palpabled right breast mass,  prompting imaging & subsequent US biopsy confirming breast cancer.  2024 Bilateral screening mammogram - no suspicious masses, calcifications, or areas of architectural distortion. Post reduction mammoplasty changes are present. Benign appearing calcifications are present. No mammographic evidence of malignancy, BI RADS 2  2025 Unilateral right breast diagnostic mammogram/breast US  ([facility])  6:00, 2cmfn a [id number] cyst. Corresponding to the palpable abnormality at 8.00, 8cmfn is a 2.9x2.2x2.6cm lobulated/microlobulated/spiculated mass with vascularity and sonographic characteristics concordant with a malignancy. The palpable mass in the in the right breast has mammographic and sonographic characteristic concordant with malignant lesion. Sonographic guided core biopsy was recommended, BI RADS 5.  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 [place] score 9/9.Invasive tumor measures at least 19 mm. Lymphovascular permeation by tumor not seen, ER: Positive (11-20%), MA: Negative (<1%), Her-2: Positive (score 3+)., Ki67: 60%.  25 MRI Breast - IMPRESSION: - Biopsy proven malignancy in the lower outer right breast for which continued surgical/oncologic management is recommended. - Multiple additional indeterminate areas of nonmass enhancement superior to the dominant mass, as well as additional subcentimeter enhancing  masses outer breast - if it will alter management, MR guided biopsy of one of these areas distant to the dominant mass may be performed. - 5 mm indeterminate enhancing mass in the superior central left breast for which MR guided core needle biopsy is recommended. Assuming benign results are obtained short-term follow-up of additional finding in the far posterior medial to central left breast would be recommended. - Nonspecific skin thickening and enhancement along the lower outer right breast and chest wall which is nonspecific (c/w keloids)  25 CT Chest- small subcm pulm nodules (less than 4mm); no other evidence of malignancy CT Abd/Plv pending 25  Bone scan  RISK ASSESSMENT: ; first full term pregnancy at age 18 Menarche at 14, menopause at 49 No OCPs, No HRT, No fertility treatments  Sister with breast cancer now ; was diagnosed in her 40s; breast cancer in both maternal and paternal cousings Germline Genetics - Negative, VUS MSH6 gene  PCP - Dr. Hayden Gerardo Surgeon - Dr. Gibran Mars Rad Onc - Dr. Sondra Gerardo [de-identified] : poorly differentiated [de-identified] : ER+ weak (11-20%) MS neg, HER2 + (1HC 3+) [de-identified] : Ki67 60% [de-identified] : 7/15/25 Patient returns today to rule out progression or recurrence of breast cancer and to assess treatment toxicity.  Today is C2D8 of TCHP. The patient state she had episode of diarrhea on day 3 that resolved with imodium.   - Reports pain during urination, with cloudy urine similar to previous UTI - Reports legs have been susceptible to muscle cramps - She denies any n/v, numbness/tingling, cough, cp, sob.  Recent Imaging: ============== CT c/a/p, 5/27/25- IMPRESSION:  Two very small indeterminate right lung nodules. Recommend follow-up in 3 months, or per guidelines of primary neoplasm. Nonspecific focal skin thickening in the left lateral chest wall. Recommend direct inspection. Right breast mass consistent with known breast cancer. No evidence of abdominal or pelvic metastatic disease.  Question constipation. Chronic findings as above.  Bone scan, 5/27/25- IMPRESSION: No definite scintigraphic evidence of osseous metastasis.

## 2025-07-15 NOTE — ASSESSMENT
[Curative] : Goals of care discussed with patient: Curative [FreeTextEntry1] : 61 yo woman presented with clinical Stage IIA right breast cancer (ER+ weak, AR neg, HER2+).  Staging scans showed no evidence of metastatic disease.  CT Chest will need follow-up CT in about 3 months (8/2025) to ensure stability of pulmonary nodules. Recommended treatment regimen with TCHP regimen (docetaxel 75mg/m2, carboplatin AUC 5-6, trastuzumab 6mg/kg after loading dose and pertuzumab 420mg after loading dose) for a total of 6 doses every 3 weeks, with growth factor support with peg-filgastrim was reviewed and discussed with the patient in detail.    - At the time of surgery, if she achieves complete pathologic response, would continue HER2 directed therapy with trastuzumab and pertuzumab to complete 1 year of therapy. However, if she has residual disease, would change her therapy to TDM-1 (Kadcyla) to complete 1 year of therapy as per results of the Yenifer Trial that has shown better disease-free survival. - She may need adjuvant RT as well if she opts for breast conservation surgery. - Echo done with PMD (Dr. Hayden Gerardo); 4/15/25- LVEF 74%. Repeat due every three months. Next due 9/2025.  - Today is C2D8.  CBC adequate/stable - will order urinalysis and urine culture today - will order bactrim BID for the next 7 days; will change therapy based on results of urine culture  - Patient had the opportunity to have all her questions answered to her satisfaction - Patient to RTO in two weeks for C3 TCHP

## 2025-07-15 NOTE — ASSESSMENT
[Curative] : Goals of care discussed with patient: Curative [FreeTextEntry1] : 61 yo woman presented with clinical Stage IIA right breast cancer (ER+ weak, [place] neg, HER2+).  Staging scans showed no evidence of metastatic disease.  CT Chest will need follow-up CT in about 3 months (8/2025) to ensure stability of pulmonary nodules. Recommended treatment regimen with TCHP regimen (docetaxel 75mg/m2, carboplatin AUC 5-6, trastuzumab 6mg/kg after loading dose and pertuzumab 420mg after loading dose) for a total of 6 doses every 3 weeks, with growth factor support with peg-filgastrim was reviewed and discussed with the patient in detail.    - At the time of surgery, if she achieves complete pathologic response, would continue HER2 directed therapy with trastuzumab and pertuzumab to complete 1 year of therapy. However, if she has residual disease, would change her therapy to TDM-1 (Kadcyla) to complete 1 year of therapy as per results of the Yenifer Trial that has shown better disease-free survival. - She may need adjuvant RT as well if she opts for breast conservation surgery. - Echo done with PMD (Dr. Hayden Gerardo); 4/15/25- LVEF 74%. Repeat due every three months. Next due 9/2025.  - Today is C2D8.  CBC adequate/stable - will order urinalysis and urine culture today - will order bactrim BID for the next 7 days; will change therapy based on results of urine culture  - Patient had the opportunity to have all her questions answered to her satisfaction - Patient to RTO in two weeks for C3 TCHP